# Patient Record
Sex: FEMALE | Employment: FULL TIME | URBAN - METROPOLITAN AREA
[De-identification: names, ages, dates, MRNs, and addresses within clinical notes are randomized per-mention and may not be internally consistent; named-entity substitution may affect disease eponyms.]

---

## 2017-12-14 PROBLEM — Z30.09 FAMILY PLANNING EDUCATION, GUIDANCE, AND COUNSELING: Status: ACTIVE | Noted: 2017-12-14

## 2018-01-15 PROBLEM — R87.612 LGSIL ON PAP SMEAR OF CERVIX: Status: ACTIVE | Noted: 2018-01-15

## 2018-01-31 PROCEDURE — 88305 TISSUE EXAM BY PATHOLOGIST: CPT | Performed by: OBSTETRICS & GYNECOLOGY

## 2018-02-02 ENCOUNTER — HOSPITAL ENCOUNTER (OUTPATIENT)
Dept: LAB | Age: 28
Discharge: HOME | End: 2018-02-02

## 2018-10-25 PROCEDURE — 88142 CYTOPATH C/V THIN LAYER: CPT

## 2018-10-26 ENCOUNTER — HOSPITAL ENCOUNTER (OUTPATIENT)
Dept: LAB | Age: 28
Discharge: HOME | End: 2018-10-26

## 2019-06-19 ENCOUNTER — HOSPITAL ENCOUNTER (OUTPATIENT)
Dept: MAMMOGRAPHY | Age: 29
Discharge: HOME | End: 2019-06-19
Attending: FAMILY MEDICINE

## 2019-06-19 DIAGNOSIS — N63.20 LEFT BREAST LUMP: ICD-10-CM

## 2019-10-17 ENCOUNTER — HOSPITAL ENCOUNTER (OUTPATIENT)
Dept: MAMMOGRAPHY | Age: 29
Discharge: HOME | End: 2019-10-17
Attending: FAMILY MEDICINE
Payer: SELF-PAY

## 2019-10-17 PROCEDURE — 76642 ULTRASOUND BREAST LIMITED: CPT

## 2019-10-23 ENCOUNTER — HOSPITAL ENCOUNTER (OUTPATIENT)
Dept: MAMMOGRAPHY | Age: 29
Discharge: HOME | End: 2019-10-23
Attending: FAMILY MEDICINE
Payer: COMMERCIAL

## 2019-10-23 VITALS — HEART RATE: 77 BPM | DIASTOLIC BLOOD PRESSURE: 77 MMHG | SYSTOLIC BLOOD PRESSURE: 118 MMHG

## 2019-10-23 DIAGNOSIS — N63.20 BREAST MASS, LEFT: ICD-10-CM

## 2019-10-23 PROCEDURE — 19083 BX BREAST 1ST LESION US IMAG: CPT

## 2019-10-23 PROCEDURE — 74011000250 HC RX REV CODE- 250: Performed by: FAMILY MEDICINE

## 2019-10-23 PROCEDURE — 88305 TISSUE EXAM BY PATHOLOGIST: CPT

## 2019-10-23 RX ORDER — LIDOCAINE HYDROCHLORIDE 10 MG/ML
6 INJECTION INFILTRATION; PERINEURAL
Status: COMPLETED | OUTPATIENT
Start: 2019-10-23 | End: 2019-10-23

## 2019-10-23 RX ADMIN — LIDOCAINE HYDROCHLORIDE 6 ML: 10 INJECTION, SOLUTION INFILTRATION; PERINEURAL at 09:18

## 2019-10-24 NOTE — PROGRESS NOTES
Dear Ms. Laurent Hodgkins    Your recent result of the biopsy showed:     A:  \"LEFT BREAST, 4:00 POSITION, 4 CM FROM NIPPLE, CORE BIOPSY\":       FRAGMENTS OF FIBROADENOMA, SIMPLE    It is a benign finding.     Thanks,  Dr. Jemal Finch

## 2019-11-04 NOTE — PROGRESS NOTES
Hi,    Pathology results from date: 10/28/2019 Pathology was noted as A: Left breast, 4:00 position, 4 cm from  nipple, core biopsy: Fragments of fibroadenoma, simple. Per Zarina Contreras and Radiologist recommendations Results concordant with imaging and return for bilateral screening mammogram at age 38 is recommended per benign percutaneous biopsy protocol.       Thanks,  Dr. Jie Niño